# Patient Record
Sex: MALE | Race: WHITE
[De-identification: names, ages, dates, MRNs, and addresses within clinical notes are randomized per-mention and may not be internally consistent; named-entity substitution may affect disease eponyms.]

---

## 2018-01-23 ENCOUNTER — HOSPITAL ENCOUNTER (EMERGENCY)
Dept: HOSPITAL 17 - NEPD | Age: 58
Discharge: HOME | End: 2018-01-23
Payer: COMMERCIAL

## 2018-01-23 VITALS
OXYGEN SATURATION: 98 % | HEART RATE: 116 BPM | TEMPERATURE: 98.8 F | SYSTOLIC BLOOD PRESSURE: 135 MMHG | DIASTOLIC BLOOD PRESSURE: 90 MMHG | RESPIRATION RATE: 14 BRPM

## 2018-01-23 VITALS — BODY MASS INDEX: 25.92 KG/M2 | HEIGHT: 72 IN | WEIGHT: 191.41 LBS

## 2018-01-23 DIAGNOSIS — R07.81: ICD-10-CM
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
## 2018-01-23 DIAGNOSIS — S39.012A: Primary | ICD-10-CM

## 2018-01-23 LAB
ALBUMIN SERPL-MCNC: 3.6 GM/DL (ref 3.4–5)
ALP SERPL-CCNC: 57 U/L (ref 45–117)
ALT SERPL-CCNC: 22 U/L (ref 12–78)
AST SERPL-CCNC: 14 U/L (ref 15–37)
BASOPHILS # BLD AUTO: 0.1 TH/MM3 (ref 0–0.2)
BASOPHILS NFR BLD: 0.6 % (ref 0–2)
BILIRUB SERPL-MCNC: 1.2 MG/DL (ref 0.2–1)
BUN SERPL-MCNC: 11 MG/DL (ref 7–18)
CALCIUM SERPL-MCNC: 8.6 MG/DL (ref 8.5–10.1)
CHLORIDE SERPL-SCNC: 96 MEQ/L (ref 98–107)
COLOR UR: YELLOW
CREAT SERPL-MCNC: 1.38 MG/DL (ref 0.6–1.3)
EOSINOPHIL # BLD: 0.2 TH/MM3 (ref 0–0.4)
EOSINOPHIL NFR BLD: 1.5 % (ref 0–4)
ERYTHROCYTE [DISTWIDTH] IN BLOOD BY AUTOMATED COUNT: 13.9 % (ref 11.6–17.2)
GFR SERPLBLD BASED ON 1.73 SQ M-ARVRAT: 53 ML/MIN (ref 89–?)
GLUCOSE SERPL-MCNC: 136 MG/DL (ref 74–106)
GLUCOSE UR STRIP-MCNC: (no result) MG/DL
HCO3 BLD-SCNC: 27.3 MEQ/L (ref 21–32)
HCT VFR BLD CALC: 40.2 % (ref 39–51)
HGB BLD-MCNC: 13.8 GM/DL (ref 13–17)
HGB UR QL STRIP: (no result)
INR PPP: 1 RATIO
KETONES UR STRIP-MCNC: (no result) MG/DL
LYMPHOCYTES # BLD AUTO: 2.1 TH/MM3 (ref 1–4.8)
LYMPHOCYTES NFR BLD AUTO: 19.9 % (ref 9–44)
MCH RBC QN AUTO: 30.1 PG (ref 27–34)
MCHC RBC AUTO-ENTMCNC: 34.4 % (ref 32–36)
MCV RBC AUTO: 87.6 FL (ref 80–100)
MONOCYTE #: 1.1 TH/MM3 (ref 0–0.9)
MONOCYTES NFR BLD: 10.5 % (ref 0–8)
NEUTROPHILS # BLD AUTO: 7.1 TH/MM3 (ref 1.8–7.7)
NEUTROPHILS NFR BLD AUTO: 67.5 % (ref 16–70)
NITRITE UR QL STRIP: (no result)
PLATELET # BLD: 245 TH/MM3 (ref 150–450)
PMV BLD AUTO: 8.1 FL (ref 7–11)
PROT SERPL-MCNC: 8.2 GM/DL (ref 6.4–8.2)
PROTHROMBIN TIME: 10.4 SEC (ref 9.8–11.6)
RBC # BLD AUTO: 4.59 MIL/MM3 (ref 4.5–5.9)
SODIUM SERPL-SCNC: 131 MEQ/L (ref 136–145)
SP GR UR STRIP: 1.01 (ref 1–1.03)
URINE LEUKOCYTE ESTERASE: (no result)
WBC # BLD AUTO: 10.6 TH/MM3 (ref 4–11)

## 2018-01-23 PROCEDURE — 85610 PROTHROMBIN TIME: CPT

## 2018-01-23 PROCEDURE — 71101 X-RAY EXAM UNILAT RIBS/CHEST: CPT

## 2018-01-23 PROCEDURE — 85730 THROMBOPLASTIN TIME PARTIAL: CPT

## 2018-01-23 PROCEDURE — 81001 URINALYSIS AUTO W/SCOPE: CPT

## 2018-01-23 PROCEDURE — 72131 CT LUMBAR SPINE W/O DYE: CPT

## 2018-01-23 PROCEDURE — 85025 COMPLETE CBC W/AUTO DIFF WBC: CPT

## 2018-01-23 PROCEDURE — 96372 THER/PROPH/DIAG INJ SC/IM: CPT

## 2018-01-23 PROCEDURE — 80053 COMPREHEN METABOLIC PANEL: CPT

## 2018-01-23 PROCEDURE — 99285 EMERGENCY DEPT VISIT HI MDM: CPT

## 2018-01-23 NOTE — RADRPT
EXAM DATE/TIME:  01/23/2018 14:19 

 

[facility] COMPARISON:     

No previous studies available for comparison.

 

                     

INDICATIONS :     

Son jumped on him yesterday

                     

 

MEDICAL HISTORY :     

None.          

 

SURGICAL HISTORY :        

left shoulder surgery 

 

ENCOUNTER:     

Initial                                        

 

ACUITY:     

2 days      

 

PAIN SCORE:     

8/10

 

LOCATION:     

Left  ribs

 

FINDINGS:     

Multiple views of the left ribs were performed.  There is no evidence of displaced fracture.  No dest
ructive lesions or areas of periosteal thickening are seen.  Expiratory view of the chest is negative
 for pneumothorax.  The mediastinal structures are midline.  

 

CONCLUSION:     

1. No acute findings.

 

 

 

 Allen Westbrook MD on January 23, 2018 at 15:02           

Board Certified Radiologist.

 This report was verified electronically.

## 2018-01-23 NOTE — PD
HPI


Chief Complaint:  Back/ Neck Pain or Injury


Time Seen by Provider:  12:51


Travel History


International Travel<30 days:  No


Contact w/Intl Traveler<30days:  No


Traveled to known affect area:  No





History of Present Illness


HPI


57-year-old male presents to the emergency Department with complaint of low 

back pain, worse on the left than the right, after his 70-75 pound autistic son 

bounced up and down on his lower back yesterday 3 times.  He is also 

complaining of left rib cage pain.  Denies chest pain, shortness of breath, 

abdominal pain.  Says the pain does radiate to his abdomen.  His abdomen is not 

painful.  Denies encopresis, incontinence, saddle anesthesias.  Denies 

paresthesias, loss of sensation, decreased range of motion, decreased strength 

to bilateral lower extremities.  Is ambulatory.  Denies fever, vomiting.  Took 

an old prescription of tramadol for symptom management.  Has tried icy hot and 

heating pad.  Rates pain 8/10.  Describes it as an aching sensation.  Worse 

with movement.  No known relieving factors.  Dr. Vieyra is primary care 

provider.  Denies significant past medical history.  No known allergies.  Has 

no other medical complaints.  No other modifying factors or associated signs 

and symptoms.





PFSH


Past Medical History


Tetanus Vaccination:  < 5 Years





Social History


Alcohol Use:  No


Tobacco Use:  No


Substance Use:  No





Allergies-Medications


(Allergen,Severity, Reaction):  


Coded Allergies:  


     No Known Allergies (Unverified  Adverse Reaction, Unknown, 1/16/18)


Reported Meds & Prescriptions





Reported Meds & Active Scripts


Active


Norco (Hydrocodone-Acetaminophen) 5 Mg-325 Mg Tab 1 Tab PO Q4H PRN


Robaxin (Methocarbamol) 500 Mg Tab 500 Mg PO QID PRN


Ibuprofen 800 Mg Tab 800 Mg PO Q6HR PRN


Tramadol (Tramadol HCl) 50 Mg Tab 50 Mg PO Q8H PRN








Review of Systems


Except as stated in HPI:  all other systems reviewed are Neg





Physical Exam


Narrative


GENERAL: Well-nourished, well-developed  male patient, in no acute 

distress; afebrile, nontoxic-appearing


SKIN: Warm and dry.


HEAD: Atraumatic. Normocephalic. 


EYES: Pupils equal and round. No scleral icterus. No injection or drainage.


ENT: Mucosa pink and moist.  Airway patent.


NECK: Trachea midline.


CHEST: Producible tenderness to the left lateral rib cage at approximately the 

ninth to 11th ribs; without deformity or crepitance.  No retractions or use of 

accessory muscles.


CARDIOVASCULAR: Regular rate and rhythm.  No murmur appreciated.  


RESPIRATORY: No accessory muscle use.  Breath sounds clear and equal 

bilaterally.  No retractions or tachypnea.


GASTROINTESTINAL:  Abdomen soft, non-tender, nondistended. Positive bowel 

sounds.  No hepato-splenomegaly, or  


palpable masses. No guarding.


MUSCULOSKELETAL:  Bilateral lower extremities supple and non-tense with 2+ 

pedal pulses and sensory intact; with full range of motion and 5/5 strength.  2

+ DTRs bilaterally.   Active dorsiflexion and extension of bilateral feet.  

Bilateral straight leg raise is positive for low back pain.  Ambulatory in room 

with normal gait.  Sitting up in bed at 90.  No obvious deformities. No 

clubbing.  No cyanosis.  No edema. 


BACK:  Midline point tenderness on palpation of the lumbar spine.  No midline 

tenderness on palpation of the thoracic spine.  Tenderness on palpation of 

bilateral lumbar paraspinal and iliosacral area.  No obvious deformities.


NEUROLOGICAL: Awake and alert.  Oriented 3.  No obvious cranial nerve 

deficits.  Motor grossly within normal limits. Normal speech.  Moves all 

extremities.  5/5 strength to all extremities.  Sensory intact.


PSYCHIATRIC: Appropriate mood and affect; insight and judgment normal.





Data


Data


Last Documented VS





Vital Signs








  Date Time  Temp Pulse Resp B/P (MAP) Pulse Ox O2 Delivery O2 Flow Rate FiO2


 


1/23/18 11:50 98.8 116 14 135/90 (105) 98   








Orders





 Orders


Complete Blood Count With Diff (1/23/18 11:59)


Comprehensive Metabolic Panel (1/23/18 11:59)


Act Partial Throm Time (Ptt) (1/23/18 11:59)


Prothrombin Time / Inr (Pt) (1/23/18 11:59)


Urinalysis - C+S If Indicated (1/23/18 11:59)


Ketorolac Inj (Toradol Inj) (1/23/18 13:15)


Orphenadrine Inj (Norflex Inj) (1/23/18 13:15)


Acetamin-Hydrocod 325-5 Mg (Norco  5-325 (1/23/18 13:15)


Ct Lumb Spine W/O Contrast (1/23/18 )


Ribs, Uni (W/Exp Cxr-Min 3vw) (1/23/18 )





Labs





Laboratory Tests








Test


  1/23/18


12:10


 


White Blood Count 10.6 TH/MM3 


 


Red Blood Count 4.59 MIL/MM3 


 


Hemoglobin 13.8 GM/DL 


 


Hematocrit 40.2 % 


 


Mean Corpuscular Volume 87.6 FL 


 


Mean Corpuscular Hemoglobin 30.1 PG 


 


Mean Corpuscular Hemoglobin


Concent 34.4 % 


 


 


Red Cell Distribution Width 13.9 % 


 


Platelet Count 245 TH/MM3 


 


Mean Platelet Volume 8.1 FL 


 


Neutrophils (%) (Auto) 67.5 % 


 


Lymphocytes (%) (Auto) 19.9 % 


 


Monocytes (%) (Auto) 10.5 % 


 


Eosinophils (%) (Auto) 1.5 % 


 


Basophils (%) (Auto) 0.6 % 


 


Neutrophils # (Auto) 7.1 TH/MM3 


 


Lymphocytes # (Auto) 2.1 TH/MM3 


 


Monocytes # (Auto) 1.1 TH/MM3 


 


Eosinophils # (Auto) 0.2 TH/MM3 


 


Basophils # (Auto) 0.1 TH/MM3 


 


CBC Comment DIFF FINAL 


 


Differential Comment  


 


Prothrombin Time 10.4 SEC 


 


Prothromb Time International


Ratio 1.0 RATIO 


 


 


Activated Partial


Thromboplast Time 31.4 SEC 


 


 


Urine Color YELLOW 


 


Urine Turbidity CLEAR 


 


Urine pH 5.5 


 


Urine Specific Gravity 1.014 


 


Urine Protein NEG mg/dL 


 


Urine Glucose (UA) NEG mg/dL 


 


Urine Ketones NEG mg/dL 


 


Urine Occult Blood NEG 


 


Urine Nitrite NEG 


 


Urine Bilirubin NEG 


 


Urine Urobilinogen


  LESS THAN 2.0


MG/DL


 


Urine Leukocyte Esterase NEG 


 


Urine RBC


  LESS THAN 1


/hpf


 


Urine WBC 1 /hpf 


 


Microscopic Urinalysis Comment


  CULT NOT


INDICATED


 


Blood Urea Nitrogen 11 MG/DL 


 


Creatinine 1.38 MG/DL 


 


Random Glucose 136 MG/DL 


 


Total Protein 8.2 GM/DL 


 


Albumin 3.6 GM/DL 


 


Calcium Level 8.6 MG/DL 


 


Alkaline Phosphatase 57 U/L 


 


Aspartate Amino Transf


(AST/SGOT) 14 U/L 


 


 


Alanine Aminotransferase


(ALT/SGPT) 22 U/L 


 


 


Total Bilirubin 1.2 MG/DL 


 


Sodium Level 131 MEQ/L 


 


Potassium Level 4.2 MEQ/L 


 


Chloride Level 96 MEQ/L 


 


Carbon Dioxide Level 27.3 MEQ/L 


 


Anion Gap 8 MEQ/L 


 


Estimat Glomerular Filtration


Rate 53 ML/MIN 


 











MDM


Medical Decision Making


Medical Screen Exam Complete:  Yes


Emergency Medical Condition:  Yes


Medical Record Reviewed:  Yes


Differential Diagnosis


Low back strain, no back pain, lumbar fracture, herniated disc, rib contusion, 

rib fracture


Narrative Course


57-year-old male with low back injury and left rib cage pain.  He has no 

reproducible abdominal tenderness on exam.  His pain does radiate to his abdomen

, but he denies abdominal pain.  Denies encopresis, incontinence, saddle 

anesthesia.  Patient is able to bring the room with a guarded gait.  He has 

midline tenderness on palpation of the lumbar spine.  Reproducible tenderness 

to the left lateral lower rib cage.  No acute distress.  No retractions or 

tachypnea.  CBC, CMP, coags, urinalysis ordered in triage.  CT lumbar spine, 

left rib with expiratory chest x-ray, Toradol, Norflex, Norco ordered.


1253:  CBC unremarkable.  Sodium 131.  Creatinine 1.38.  Otherwise, CMP 

unremarkable.  Coags unremarkable.  Urine analysis unremarkable.


1622:  CT lumbar spine and left rib x-ray chest x-ray concludes:  











Ribs X-Ray 1/23/18 0000 Signed





Impressions: 





 Service Date/Time:  Tuesday, January 23, 2018 14:19 - CONCLUSION:  1. No acute 





 findings.     Allen Westbrook MD 


 


Lumbar Spine CT 1/23/18 0000 Signed





Impressions: 





 Service Date/Time:  Tuesday, January 23, 2018 13:50 - CONCLUSION:  1. No acute 





 fracture. 2. Bilateral pars defects at L5 with mild grade 1 anterolisthesis of 





 L5 on S1. There is also mild retrolisthesis of L4 on L5. 3. Multilevel 





 degenerative spondylosis of the lumbar spine most prominently at L3-S1, as 





 above.     Kayden Waldron MD 





A copy of the CT and x-ray reports were provided to the patient.  Norflex, 

ibuprofen, and a short course of Norco prescribed for home.  Instructed patient 

to follow up with primary care provider.  Patient verbalizes understanding and 

agreement with treatment plan.  Patient is medically cleared and stable for 

discharge.  Discussed reasons to return to the emergency department.  Patient 

agrees with treatment plan.  The patients vital signs are stable and the 

patient is stable for outpatient follow-up and treatment.  Patient discharged 

home, stable and in no acute distress.





Diagnosis





 Primary Impression:  


 Low back pain


 Qualified Codes:  M54.5 - Low back pain


 Additional Impressions:  


 Low back strain


 Qualified Codes:  S39.012A - Strain of muscle, fascia and tendon of lower back

, initial encounter


 Rib pain on left side


Referrals:  


Primary Care Physician


Patient Instructions:  Acute Low Back Pain (ED), General Instructions, Low Back 

Strain (ED), Rib Contusion (ED)





***Additional Instructions:  


Tylenol or ibuprofen as directed and as needed for pain


Robaxin as prescribed and as needed for muscle spasms


Heating pad and/or ice to affected area to reduce pain


Avoid aggravating activities; increase activity as tolerated


Follow-up with primary care provider


Return to emergency department immediately with worsening of symptoms


***Med/Other Pt SpecificInfo:  Prescription(s) given


Scripts


Hydrocodone-Acetaminophen (Norco) 5 Mg-325 Mg Tab


1 TAB PO Q4H Y for PAIN, #8 TAB 0 Refills


   Prov: Day Ramesh         1/23/18 


Methocarbamol (Robaxin) 500 Mg Tab


500 MG PO QID Y for MUSCLE SPASM, #30 TAB 0 Refills


   Prov: Day Ramesh         1/23/18 


Ibuprofen (Ibuprofen) 800 Mg Tab


800 MG PO Q6HR Y for PAIN, #30 TAB 0 Refills


   Prov: Day Ramesh         1/23/18


Disposition:  01 DISCHARGE HOME


Condition:  Stable











Day Ramesh Jan 23, 2018 12:55

## 2018-01-23 NOTE — RADRPT
EXAM DATE/TIME:  01/23/2018 13:50 

 

[facility] COMPARISON:     

No previous studies available for comparison.

 

 

INDICATIONS :     

Low back pain, his son jumped on his back yesterday.

                      

 

RADIATION DOSE:     

34.09 CTDIvol (mGy) 

 

 

 

MEDICAL HISTORY :     

None  

 

SURGICAL HISTORY :      

None. 

 

ENCOUNTER:      

Initial

 

ACUITY:      

1 day

 

PAIN SCALE:      

8/10

 

LOCATION:         

Lumbar spine

 

TECHNIQUE:     

Volumetric scanning of the lumbar spine was performed.  Multiplanar reconstructions in the sagittal, 
coronal and oblique axial planes were performed.  Using automated exposure control and adjustment of 
the mA and/or kV according to patient size, radiation dose was kept as low as reasonably achievable t
o obtain optimal diagnostic quality images.   DICOM format image data is available electronically for
 review and comparison.  

 

FINDINGS:       

 

VERTEBRAE:     

Most caudal which will body is labeled L5. Vertebral body heights are intact without evidence for acu
te bony fracture. There are bilateral pars defects noted at L5.

 

ALIGNMENT:     

Mild grade 1, less than 5 mm, anterolisthesis of L5 on S1. Mild grade 1, less than 5 mm, posterolisth
esis of L4 on L5. Slight levoscoliosis of the lower lumbar spine centered at L3-4.

 

Paraspinal soft tissues: 

Visualized portions of the kidneys, adrenal glands, and paraspinal soft tissues are unremarkable. Abd
ominal aorta is non-aneurysmal. No retroperitoneal adenopathy.

 

 

T12-L1: 

The thecal sac has a normal diameter.  No evidence of disc bulge or protrusion.  The neural foramina 
are patent bilaterally.

 

L1-L2: 

Minimal diffuse disc bulge with mild bilateral facet arthropathy. No significant central canal or claire
ral foraminal stenosis.

 

L2-L3:    

Mild diffuse disc bulge and mild bilateral facet arthropathy. No significant central canal or neurofo
raminal stenosis.

 

L3-L4: 

Disc space loss with diffuse disc bulge and primarily anterolateral osteophytes. Mild bilateral facet
 arthropathy and ligamentum flavum hypertrophy. Effacement of the anterior thecal sac with central ca
nal measuring approximately 12 mm. Mild caudal left neural foraminal narrowing.

 

L4-L5: 

Diffuse disc bulge, ligamentum flavum hypertrophy and a prominent collateral facet arthropathy. Effac
ement of the anterior thecal sac with central canal measuring approximately 12 mm. Moderate left neur
al foraminal narrowing.

 

L5-S1: 

Diffuse disc bulge and mild posterior osteophytes. Pigmented flavum hypertrophy and prominent collate
ral facet arthropathy. Central canal is patent. Moderate left and severe right neural foraminal narro
wing.

 

CONCLUSION:     

1. No acute fracture.

2. Bilateral pars defects at L5 with mild grade 1 anterolisthesis of L5 on S1. There is also mild ret
rolisthesis of L4 on L5.

3. Multilevel degenerative spondylosis of the lumbar spine most prominently at L3-S1, as above.

 

 

 

 Kayden Waldron MD on January 23, 2018 at 14:31           

Board Certified Radiologist.

 This report was verified electronically.